# Patient Record
Sex: MALE | Race: WHITE | URBAN - NONMETROPOLITAN AREA
[De-identification: names, ages, dates, MRNs, and addresses within clinical notes are randomized per-mention and may not be internally consistent; named-entity substitution may affect disease eponyms.]

---

## 2017-11-15 ENCOUNTER — OFFICE VISIT (OUTPATIENT)
Dept: FAMILY MEDICINE CLINIC | Age: 37
End: 2017-11-15

## 2017-11-15 VITALS
OXYGEN SATURATION: 98 % | BODY MASS INDEX: 31.78 KG/M2 | WEIGHT: 222 LBS | TEMPERATURE: 98.1 F | HEIGHT: 70 IN | HEART RATE: 65 BPM | SYSTOLIC BLOOD PRESSURE: 132 MMHG | DIASTOLIC BLOOD PRESSURE: 76 MMHG

## 2017-11-15 DIAGNOSIS — G56.03 BILATERAL CARPAL TUNNEL SYNDROME: ICD-10-CM

## 2017-11-15 DIAGNOSIS — M25.542 ARTHRALGIA OF BOTH HANDS: ICD-10-CM

## 2017-11-15 DIAGNOSIS — M25.541 ARTHRALGIA OF BOTH HANDS: ICD-10-CM

## 2017-11-15 DIAGNOSIS — K21.9 GASTROESOPHAGEAL REFLUX DISEASE, ESOPHAGITIS PRESENCE NOT SPECIFIED: Primary | ICD-10-CM

## 2017-11-15 LAB
A/G RATIO: 2.3 (ref 1.1–2.2)
ALBUMIN SERPL-MCNC: 5 G/DL (ref 3.4–5)
ALP BLD-CCNC: 50 U/L (ref 40–129)
ALT SERPL-CCNC: 32 U/L (ref 10–40)
ANION GAP SERPL CALCULATED.3IONS-SCNC: 16 MMOL/L (ref 3–16)
AST SERPL-CCNC: 25 U/L (ref 15–37)
BASOPHILS ABSOLUTE: 0 K/UL (ref 0–0.2)
BASOPHILS RELATIVE PERCENT: 0.5 %
BILIRUB SERPL-MCNC: 0.8 MG/DL (ref 0–1)
BUN BLDV-MCNC: 21 MG/DL (ref 7–20)
C-REACTIVE PROTEIN: 1.4 MG/L (ref 0–5.1)
CALCIUM SERPL-MCNC: 9.7 MG/DL (ref 8.3–10.6)
CHLORIDE BLD-SCNC: 101 MMOL/L (ref 99–110)
CO2: 25 MMOL/L (ref 21–32)
CREAT SERPL-MCNC: 0.9 MG/DL (ref 0.9–1.3)
EOSINOPHILS ABSOLUTE: 0.1 K/UL (ref 0–0.6)
EOSINOPHILS RELATIVE PERCENT: 1.1 %
GFR AFRICAN AMERICAN: >60
GFR NON-AFRICAN AMERICAN: >60
GLOBULIN: 2.2 G/DL
GLUCOSE BLD-MCNC: 82 MG/DL (ref 70–99)
HCT VFR BLD CALC: 48.6 % (ref 40.5–52.5)
HEMOGLOBIN: 17.1 G/DL (ref 13.5–17.5)
LYMPHOCYTES ABSOLUTE: 2.1 K/UL (ref 1–5.1)
LYMPHOCYTES RELATIVE PERCENT: 24.6 %
MCH RBC QN AUTO: 31.1 PG (ref 26–34)
MCHC RBC AUTO-ENTMCNC: 35.2 G/DL (ref 31–36)
MCV RBC AUTO: 88.6 FL (ref 80–100)
MONOCYTES ABSOLUTE: 0.6 K/UL (ref 0–1.3)
MONOCYTES RELATIVE PERCENT: 6.7 %
NEUTROPHILS ABSOLUTE: 5.8 K/UL (ref 1.7–7.7)
NEUTROPHILS RELATIVE PERCENT: 67.1 %
PDW BLD-RTO: 12.9 % (ref 12.4–15.4)
PLATELET # BLD: 243 K/UL (ref 135–450)
PMV BLD AUTO: 8.1 FL (ref 5–10.5)
POTASSIUM SERPL-SCNC: 4.9 MMOL/L (ref 3.5–5.1)
RBC # BLD: 5.49 M/UL (ref 4.2–5.9)
RHEUMATOID FACTOR: <10 IU/ML
SEDIMENTATION RATE, ERYTHROCYTE: 6 MM/HR (ref 0–15)
SODIUM BLD-SCNC: 142 MMOL/L (ref 136–145)
TOTAL PROTEIN: 7.2 G/DL (ref 6.4–8.2)
WBC # BLD: 8.6 K/UL (ref 4–11)

## 2017-11-15 PROCEDURE — 36415 COLL VENOUS BLD VENIPUNCTURE: CPT | Performed by: NURSE PRACTITIONER

## 2017-11-15 PROCEDURE — 99202 OFFICE O/P NEW SF 15 MIN: CPT | Performed by: NURSE PRACTITIONER

## 2017-11-15 ASSESSMENT — ENCOUNTER SYMPTOMS
RESPIRATORY NEGATIVE: 1
ABDOMINAL PAIN: 1
CONSTIPATION: 0
BLOOD IN STOOL: 0
DIARRHEA: 0
EYES NEGATIVE: 1

## 2017-11-15 ASSESSMENT — PATIENT HEALTH QUESTIONNAIRE - PHQ9
2. FEELING DOWN, DEPRESSED OR HOPELESS: 0
SUM OF ALL RESPONSES TO PHQ9 QUESTIONS 1 & 2: 0
SUM OF ALL RESPONSES TO PHQ QUESTIONS 1-9: 0
1. LITTLE INTEREST OR PLEASURE IN DOING THINGS: 0

## 2017-11-15 NOTE — PROGRESS NOTES
capillary refill, no deformity and no swelling. Normal sensation noted. Neurological: He is alert and oriented to person, place, and time. Skin: Skin is warm and dry. No rash noted. Psychiatric: He has a normal mood and affect. Assessment/Plan:   1. Gastroesophageal reflux disease, esophagitis presence not specified  Patient presents today with chronic dyspepsia. Patient has had symptoms for several years. Patient reports symptoms are well controlled with daily use however he has tried to reduce use however has fairly significant symptoms when he does this. Advised patient that the consequences of recurrent reflux can be worse then possible side effects of the medication. Advised to take Nexium daily. Patient verbalized understanding and agreeable to plan. 2. Arthralgia of both hands  Patient also with complaints of chronic joint pain particularly in bilateral hands (primarily the PIP and DIP joints). Patient denies any family history of RA. Discussed possible causes -  OA versus RA. Patient also reports intermittent paresthesia of fingers primarily 3 through 5 digits. Positive Phalen's and Tinel test bilat (right > left). I suspect CTS is a contributing factor. Order for labs and EMG as below. Advised on RICE, NSAID for pain and wrist splinting. Patient to f/u if no better or worsening of symptoms. If no better or worsening of symptoms will likely need referral to ortho however patient would like to hold off for now. Patient verbalized understanding and agreeable to plan. -  CBC Auto Differential  - RHEUMATOID FACTOR  - SEDIMENTATION RATE  - COMPREHENSIVE METABOLIC PANEL  - C-REACTIVE PROTEIN    3. Bilateral carpal tunnel syndrome  Occupations have likely contributed (,  and [address]). See note above.    - EMG

## 2017-11-15 NOTE — PATIENT INSTRUCTIONS
Patient Education        Carpal Tunnel Syndrome: Care Instructions  Your Care Instructions    Carpal tunnel syndrome is a nerve problem. It can cause tingling, numbness, weakness, or pain in the fingers, thumb, and hand. The median nerve and several tough tissues called tendons run through a space in the wrist called the carpal tunnel. The repeated hand motions used in work and some hobbies and sports can put pressure on the nerve. Pregnancy and several conditions, including diabetes, arthritis, and an underactive thyroid, also can cause carpal tunnel syndrome. You may be able to limit an activity or do it differently to reduce your symptoms. You also can take other steps to feel better. If your symptoms are mild, 1 to 2 weeks of home treatment are likely to ease your pain. Surgery is needed only if other treatments do not work. Follow-up care is a key part of your treatment and safety. Be sure to make and go to all appointments, and call your doctor if you are having problems. It's also a good idea to know your test results and keep a list of the medicines you take. How can you care for yourself at home? · If possible, stop or reduce the activity that causes your symptoms. If you cannot stop the activity, take frequent breaks to rest and stretch or change hand positions to do a task. Try switching hands, such as when using a computer mouse. · Try to avoid bending or twisting your wrists. · Ask your doctor if you can take an over-the-counter pain medicine, such as acetaminophen (Tylenol), ibuprofen (Advil, Motrin), or naproxen (Aleve). Be safe with medicines. Read and follow all instructions on the label. · If your doctor prescribes corticosteroid medicine to help reduce pain and swelling, take it exactly as prescribed. Call your doctor if you think you are having a problem with your medicine. · Put ice or a cold pack on your wrist for 10 to 20 minutes at a time to ease pain.  Put a thin cloth between the ice and making other lifestyle changes can also help reduce symptoms. Follow-up care is a key part of your treatment and safety. Be sure to make and go to all appointments, and call your doctor if you are having problems. Its also a good idea to know your test results and keep a list of the medicines you take. How can you care for yourself at home? · Take your medicines exactly as prescribed. Call your doctor if you think you are having a problem with your medicine. · Your doctor may recommend over-the-counter medicine. For mild or occasional indigestion, antacids, such as Tums, Gaviscon, Mylanta, or Maalox, may help. Your doctor also may recommend over-the-counter acid reducers, such as Pepcid AC, Tagamet HB, Zantac 75, or Prilosec. Read and follow all instructions on the label. If you use these medicines often, talk with your doctor. · Change your eating habits. ¨ Its best to eat several small meals instead of two or three large meals. ¨ After you eat, wait 2 to 3 hours before you lie down. ¨ Chocolate, mint, and alcohol can make GERD worse. ¨ Spicy foods, foods that have a lot of acid (like tomatoes and oranges), and coffee can make GERD symptoms worse in some people. If your symptoms are worse after you eat a certain food, you may want to stop eating that food to see if your symptoms get better. · Do not smoke or chew tobacco. Smoking can make GERD worse. If you need help quitting, talk to your doctor about stop-smoking programs and medicines. These can increase your chances of quitting for good. · If you have GERD symptoms at night, raise the head of your bed 6 to 8 inches by putting the frame on blocks or placing a foam wedge under the head of your mattress. (Adding extra pillows does not work.)  · Do not wear tight clothing around your middle. · Lose weight if you need to. Losing just 5 to 10 pounds can help. When should you call for help?   Call your doctor now or seek immediate medical care if:  · You have new or different belly pain. · Your stools are black and tarlike or have streaks of blood. Watch closely for changes in your health, and be sure to contact your doctor if:  · Your symptoms have not improved after 2 days. · Food seems to catch in your throat or chest.  Where can you learn more? Go to https://chpemanoloewjeremy.iLumen. org and sign in to your [employer] account. Enter O658 in the NewsCred box to learn more about \"Gastroesophageal Reflux Disease (GERD): Care Instructions. \"     If you do not have an account, please click on the \"Sign Up Now\" link. Current as of: August 9, 2016  Content Version: 11.3  © 9422-8414 [employer], [employer]. Care instructions adapted under license by [facility] ([facility]). If you have questions about a medical condition or this instruction, always ask your healthcare professional. [place] [address] any warranty or liability for your use of this information. Hand Pain: Care Instructions  Your Care Instructions  Common causes of hand pain are overuse and injuries, such as might happen during sports or home repair projects. Everyday wear and tear, especially as you get older, also can cause hand pain. Most minor hand injuries will heal on their own, and home treatment is usually all you need to do. If you have sudden and severe pain, you may need tests and treatment. Follow-up care is a key part of your treatment and safety. Be sure to make and go to all appointments, and call your doctor if you are having problems. Its also a good idea to know your test results and keep a list of the medicines you take. How can you care for yourself at home? · Take pain medicines exactly as directed. ¨ If the doctor gave you a prescription medicine for pain, take it as prescribed. ¨ If you are not taking a prescription pain medicine, ask your doctor if you can take an over-the-counter medicine. · Rest and protect your hand.  Take a break from any activity that may cause pain. · Put ice or a cold pack on your hand for 10 to 20 minutes at a time. Put a thin cloth between the ice and your skin. · Prop up the sore hand on a pillow when you ice it or anytime you sit or lie down during the next 3 days. Try to keep it above the level of your heart. This will help reduce swelling. · If your doctor recommends a sling, splint, or elastic bandage to support your hand, wear it as directed. When should you call for help? Call 911 anytime you think you may need emergency care. For example, call if:  · Your hand turns cool or pale or changes color. Call your doctor now or seek immediate medical care if:  · You cannot move your hand. · Your hand pops, moves out of its normal position, and then returns to its normal position. · You have signs of infection, such as:  ¨ Increased pain, swelling, warmth, or redness. ¨ Red streaks leading from the sore area. ¨ Pus draining from a place on your hand. ¨ A fever. · Your hand feels numb or tingly. Watch closely for changes in your health, and be sure to contact your doctor if:  · Your hand feels unstable when you try to use it. · You do not get better as expected. · You have any new symptoms, such as swelling. · Bruises from an injury to your hand last longer than 2 weeks. Where can you learn more? Go to https://[employer] systems.Listnerd. org and sign in to your [employer] account. Enter R273 in the Tamr box to learn more about \"Hand Pain: Care Instructions. \"     If you do not have an account, please click on the \"Sign Up Now\" link. Current as of: March 20, 2017  Content Version: 11.3  © 8148-3135 [employer], [employer]. Care instructions adapted under license by [employer] [facility] ([facility]).  If you have questions about a medical condition or this instruction, always ask your healthcare professional. Darren Pereira any warranty or liability for your use of this information.

## 2023-07-17 ENCOUNTER — OFFICE VISIT (OUTPATIENT)
Dept: ORTHOPEDIC SURGERY | Age: 43
End: 2023-07-17

## 2023-07-17 VITALS — BODY MASS INDEX: 28.7 KG/M2 | HEIGHT: 71 IN | WEIGHT: 205 LBS

## 2023-07-17 DIAGNOSIS — M25.512 LEFT SHOULDER PAIN, UNSPECIFIED CHRONICITY: ICD-10-CM

## 2023-07-17 DIAGNOSIS — M19.019 OSTEOARTHRITIS OF AC (ACROMIOCLAVICULAR) JOINT: Primary | ICD-10-CM

## 2023-07-17 DIAGNOSIS — M75.22 LEFT BICIPITAL TENOSYNOVITIS: ICD-10-CM

## 2023-07-17 DIAGNOSIS — M75.81 ROTATOR CUFF TENDONITIS, RIGHT: ICD-10-CM

## 2023-07-17 PROCEDURE — 99204 OFFICE O/P NEW MOD 45 MIN: CPT | Performed by: ORTHOPAEDIC SURGERY

## 2023-07-17 RX ORDER — MELOXICAM 15 MG/1
15 TABLET ORAL DAILY PRN
Qty: 30 TABLET | Refills: 0 | Status: SHIPPED | OUTPATIENT
Start: 2023-07-17

## 2023-07-17 NOTE — PROGRESS NOTES
170/170/40/L2             Passive Range of Motion (FF/Abd/ER/IR)     same  Negative   Neer,    trace Durant,      5/5 with slight discomfort empty Can,          5/5 ER arm at the side,       5/5   belly Press,      5/5 bear Hug,      equivocal  O'Briens,      trace TTP at Biceps Tendon Sheath,    positive Speed, positive Yergeson, positive TTP AC Joint, positive cross arm adduction,               Skin intact throughout  5/5 D B T G IO EPL  SILT Ax, R, U, M  +2 radial pulse    Diagnostic imaging:  MY READ:  4 view left shoulder 7/17/2023: Negative fracture. Positive acromioclavicular joint arthrosis moderate. Type I acromion with small anterior spur. Glenohumeral joint satisfactorily reduced. Pertinent lab work: None     Diagnosis Orders   1. Osteoarthritis of AC (acromioclavicular) joint  meloxicam (MOBIC) 15 MG tablet      2. Left bicipital tenosynovitis  meloxicam (MOBIC) 15 MG tablet      3. Rotator cuff tendonitis, right  meloxicam (MOBIC) 15 MG tablet      4. Left shoulder pain, unspecified chronicity  XR SHOULDER LEFT (MIN 2 VIEWS)          Assessment and plan: 40 y.o. male with current subjective symptoms and physical exam findings with diagnostic imaging correlating to left shoulder acromioclavicular joint arthritis, bicipital tenosynovitis, rotator cuff tendinitis/bursitis. -Time of 233 minutes was spent coordinating and discussing the clinical findings, reviewing diagnostic imaging as indicated, coordinating care with prior notes review and current clinical encounter documentation as it pertains to the patient's presenting subjective symptoms and diagnoses. -I reviewed with the patient the imaging findings as well as clinical exam and  how it correlates to subjective symptoms.  -I had a pleasant discussion with the patient today.   I reviewed with him that currently his clinical examination correlates above listed and I reviewed with him conservative care treatment options at this time to

## 2023-10-09 ENCOUNTER — OFFICE VISIT (OUTPATIENT)
Dept: ORTHOPEDIC SURGERY | Age: 43
End: 2023-10-09
Payer: COMMERCIAL

## 2023-10-09 VITALS — WEIGHT: 205 LBS | HEIGHT: 71 IN | BODY MASS INDEX: 28.7 KG/M2

## 2023-10-09 DIAGNOSIS — M19.019 OSTEOARTHRITIS OF AC (ACROMIOCLAVICULAR) JOINT: ICD-10-CM

## 2023-10-09 DIAGNOSIS — S46.012D TRAUMATIC TEAR OF LEFT ROTATOR CUFF, UNSPECIFIED TEAR EXTENT, SUBSEQUENT ENCOUNTER: ICD-10-CM

## 2023-10-09 DIAGNOSIS — M75.22 LEFT BICIPITAL TENOSYNOVITIS: Primary | ICD-10-CM

## 2023-10-09 PROCEDURE — 99213 OFFICE O/P EST LOW 20 MIN: CPT | Performed by: ORTHOPAEDIC SURGERY

## 2023-10-09 NOTE — PROGRESS NOTES
for continued conservative care options versus surgical treatment options to include left shoulder arthroscopic subacromial decompression distal clavicle resection and open subpectoral biceps tenodesis. Currently the patient wishes to pursue potential corticosteroid injection versus surgery however would like advanced imaging which I agree with at this time given continued pain despite failed conservative treatment options to include activity modification therapy and anti-inflammatory use. -MRI has been submitted to insurance for authorization for MRI left shoulder  -Mobic 15 mg p.o. daily as needed pain was prescribed as well as OTC Tylenol per bottle as needed discomfort  -Continue physician directed physical therapy as previously instructed  -All questions answered to the patient's satisfaction and the patient expressed understanding and agreement with the above listed treatment plan  -Follow up in after MRI completed to review results and determine additional treatment options correlating the patient's symptoms imaging and also his clinical exam findings.  -Thank you for the clinical consultation and allowing me to participate in the patient's care. Electronically signed by Em Mascorro MD on 10/9/23 at 11:22 AM EDT         Em Mascorro MD       Orthopaedic Surgery-Sports Medicine    Disclaimer: This note was dictated with voice recognition software. Though review and correction are routinely performed, please contact the office/medical records for any errors requiring correction.

## 2023-10-10 ENCOUNTER — TELEPHONE (OUTPATIENT)
Dept: ORTHOPEDIC SURGERY | Age: 43
End: 2023-10-10

## 2023-10-10 NOTE — TELEPHONE ENCOUNTER
General Question     Subject: MRI AUTH APPROVED  Patient and /or Facility Request: Ketty Duong  Contact Number:       [facility]ARDING THE PRIOR AUTH FOR THE MRI FOR THIS PATIENT. IT'S AN MRI  FOR THE ARM JOINT W/O CONTRAST AT [facility], AND IT HAS BEEN APPROVED, AND THE [facility] Marilyn # IS [id number].

## 2023-10-21 DIAGNOSIS — M75.81 ROTATOR CUFF TENDONITIS, RIGHT: ICD-10-CM

## 2023-10-21 DIAGNOSIS — M75.22 LEFT BICIPITAL TENOSYNOVITIS: ICD-10-CM

## 2023-10-21 DIAGNOSIS — M19.019 OSTEOARTHRITIS OF AC (ACROMIOCLAVICULAR) JOINT: ICD-10-CM

## 2023-10-23 RX ORDER — MELOXICAM 15 MG/1
15 TABLET ORAL DAILY PRN
Qty: 30 TABLET | Refills: 0 | Status: SHIPPED | OUTPATIENT
Start: 2023-10-23